# Patient Record
Sex: FEMALE | Race: WHITE | NOT HISPANIC OR LATINO | Employment: OTHER | URBAN - METROPOLITAN AREA
[De-identification: names, ages, dates, MRNs, and addresses within clinical notes are randomized per-mention and may not be internally consistent; named-entity substitution may affect disease eponyms.]

---

## 2021-04-12 ENCOUNTER — OFFICE VISIT CONVERTED (OUTPATIENT)
Dept: SURGERY | Facility: CLINIC | Age: 81
End: 2021-04-12
Attending: NURSE PRACTITIONER

## 2021-05-11 NOTE — H&P
History and Physical      Patient Name: Carol Antunez   Patient ID: [id number]   Sex: Female   YOB: 1940    Primary Care Provider: Rossy MARX   Referring Provider: Elizabeth MARX    Visit Date: April 12, 2021    Provider: ARASELI Telles   Location: [facility]   Location Address: [address] [place] [address]  [id number]   Location Phone: [phone]          Chief Complaint  · Requesting colonoscopy  · Age 50 or over  · Rectal Bleeding  · Constipation  · Abdominal Pain      History Of Present Illness  The patient is a 80 year old /White female presenting to the Surgical Specialist office on a referral from Elizabeth MARX.   Carol Antunez needs to have a diagnostic colonoscopy.   Patient states that they have had a colonoscopy. 10 years ago   Patient currently complains of: constipation, abdominal pain, and BRBPR   Patient Does not have family history of colon cancer.      Presents today on referral from Elizabeth Lu for abdominal pain, constipation and bright red blood per rectum.  Patient reports that all her symptoms have resolved about 3 weeks ago.  She reports that she was taking methotrexate and was introduced instructed to stop it and has not seen any rectal bleeding since then.  Patient believes that she was constipated and that is why she was seeing some bright red blood per rectum.  Denies any family history of colorectal cancer.    6/11: EGD & Colonoscopy ([facility]): Hiatal hernia; mild esophagitis; erosive gastritis; diverticulosis and internal hemorrhoids.       Past Medical History  Disease Name Date Onset Notes   Arthritis --  --    Arthritis --  --    Gout --  --    Heartburn --  --    Hypothyroidism, Acquired --  --    Joint Pain --  --    Rectal bleeding --  --          Past Surgical History  Procedure Name Date Notes   Appendectomy --  --    Hysterectomy --  --    Hysterectomy-Abdominal --  --    Thyroidectomy, Total --  --           Medication List  Name Date Started Instructions   diclofenac sodium 75 mg oral tablet,delayed release (DR/EC)  take 1 tablet (75 mg) by oral route 2 times per day   famotidine 40 mg oral tablet  take 1 tablet (40 mg) by oral route once daily at bedtime   folic acid 1 mg oral tablet  take 1 tablet (1 mg) by oral route once daily   Iron (ferrous sulfate) 325 mg (65 mg iron) oral tablet 02/08/2017 take 1 tablet by oral route 2 for 90 days   lisinopril-hydrochlorothiazide 20-25 mg oral tablet 02/08/2017 take 1 tablet by oral route once daily for 90 days   Synthroid 88 mcg oral tablet 02/08/2017 take 1 tablet (88 mcg) by oral route once daily for 90 days   Tylenol 325 mg oral tablet  take 1 - 2 tablets (325 - 650 mg) by oral route every 4-6 hours as needed   Tylenol PM Extra Strength  mg oral tablet  take 2 tablets by oral route once a day (at bedtime) as needed         Allergy List  Allergen Name Date Reaction Notes   naproxen --  --  --        Allergies Reconciled  Family Medical History  Disease Name Relative/Age Notes   No family history of colorectal cancer  --          Social History  Finding Status Start/Stop Quantity Notes   Alcohol Never --/-- --  04/12/2021 -    Assessed for Abuse/Neglect --  --/-- --  Denies abuse   Health care --  --/-- --  Care giver elder  states she stays with her Friday night thur Monday   Other Substance Use Never --/-- --  --    Tobacco Never --/-- --  --          Review of Systems  · Constitutional  o Denies  o : fever, chills  · Eyes  o Denies  o : yellowish discoloration of eyes  · HENT  o Denies  o : difficulty swallowing  · Cardiovascular  o Denies  o : chest pain, chest pain on exertion  · Respiratory  o Denies  o : shortness of breath  · Gastrointestinal  o Admits  o : constipation, abdominal pain, hematochezia  o Denies  o : nausea, vomiting, diarrhea  · Genitourinary  o Denies  o : abnormal color of urine  · Integument  o Denies  o :  "rash  · Neurologic  o Denies  o : tingling or numbness  · Musculoskeletal  o Denies  o : joint pain  · Endocrine  o Denies  o : weight gain, weight loss      Vitals  Date Time BP Position Site L\R Cuff Size HR RR TEMP (F) WT  HT  BMI kg/m2 BSA m2 O2 Sat FR L/min FiO2 HC       04/12/2021 11:43 AM       13  143lbs 0oz 5'  5\" 23.8 1.72             Physical Examination  · Constitutional  o Appearance  o : well developed, well-nourished, patient in no apparent distress  · Head and Face  o Head  o :   § Inspection  § : atraumatic, normocephalic  o Face  o :   § Inspection  § : no facial lesions  · Eyes  o Conjunctivae  o : conjunctivae normal  o Sclerae  o : sclerae white  · Neck  o Inspection/Palpation  o : normal appearance, no masses or tenderness, trachea midline  · Respiratory  o Respiratory Effort  o : breathing unlabored  · Skin and Subcutaneous Tissue  o General Inspection  o : no lesions present, no areas of discoloration, skin turgor normal, texture normal  · Neurologic  o Mental Status Examination  o :   § Orientation  § : grossly oriented to person, place and time  § Attention  § : attention normal, concentration abilities normal  § Fund of Knowledge  § : fund of knowledge within normal limits, patient aware of current events  o Gait and Station  o : normal gait, able to stand without difficulty  · Psychiatric  o Judgement and Insight  o : judgment and insight intact  o Mood and Affect  o : mood normal, affect appropriate              Assessment  · Bright red blood per rectum     569.3/K62.5  · Constipation     564.00/K59.00  · Internal hemorrhoids     455.0/K64.8    Problems Reconciled  Plan  · Medications  o Medications have been Reconciled  o Transition of Care or Provider Policy  · Instructions  o Given patient is asymptomatic at this time and her age we will monitor for further symptoms. I have instructed her that if she sees bright red blood per rectum again with abdominal pain or constipation to notify " the office and we will do some further testing.  o Electronically Identified Patient Education Materials Provided Electronically  · Disposition  o EMR dragon/transcription disclaimer: Much of this encounter note is an electronic transcription/translation of spoken language to printed text. Electronic translation of spoken language may permit erroneous, or at times nonsensical words or phrases to be inadvertently trasncribed; although I have reviewed the note for such errors, some may still exist.            Electronically Signed by: ARASELI Telles -Author on April 12, 2021 01:05:26 PM

## 2021-05-14 VITALS — HEIGHT: 65 IN | WEIGHT: 143 LBS | BODY MASS INDEX: 23.82 KG/M2 | RESPIRATION RATE: 13 BRPM

## 2022-04-21 ENCOUNTER — HOSPITAL ENCOUNTER (EMERGENCY)
Facility: HOSPITAL | Age: 82
Discharge: HOME | End: 2022-04-21
Attending: EMERGENCY MEDICINE | Admitting: EMERGENCY MEDICINE

## 2022-04-21 VITALS
SYSTOLIC BLOOD PRESSURE: 153 MMHG | DIASTOLIC BLOOD PRESSURE: 109 MMHG | TEMPERATURE: 98.1 F | OXYGEN SATURATION: 98 % | HEART RATE: 73 BPM | RESPIRATION RATE: 20 BRPM | HEIGHT: 65 IN

## 2022-04-21 DIAGNOSIS — K64.9 BLEEDING HEMORRHOIDS: Primary | ICD-10-CM

## 2022-04-21 LAB
ABO GROUP BLD: NORMAL
ABO GROUP BLD: NORMAL
ALBUMIN SERPL-MCNC: 3.9 G/DL (ref 3.5–5.2)
ALBUMIN/GLOB SERPL: 0.9 G/DL
ALP SERPL-CCNC: 102 U/L (ref 39–117)
ALT SERPL W P-5'-P-CCNC: 7 U/L (ref 1–33)
ANION GAP SERPL CALCULATED.3IONS-SCNC: 12.8 MMOL/L (ref 5–15)
APTT PPP: 28.2 SECONDS (ref 24.2–34.2)
AST SERPL-CCNC: 11 U/L (ref 1–32)
BASOPHILS # BLD AUTO: 0.05 10*3/MM3 (ref 0–0.2)
BASOPHILS NFR BLD AUTO: 0.5 % (ref 0–1.5)
BILIRUB SERPL-MCNC: 0.3 MG/DL (ref 0–1.2)
BLD GP AB SCN SERPL QL: NEGATIVE
BUN SERPL-MCNC: 26 MG/DL (ref 8–23)
BUN/CREAT SERPL: 23.9 (ref 7–25)
CALCIUM SPEC-SCNC: 9.1 MG/DL (ref 8.6–10.5)
CHLORIDE SERPL-SCNC: 99 MMOL/L (ref 98–107)
CO2 SERPL-SCNC: 24.2 MMOL/L (ref 22–29)
CREAT SERPL-MCNC: 1.09 MG/DL (ref 0.57–1)
D-LACTATE SERPL-SCNC: 1.4 MMOL/L (ref 0.5–2)
DEPRECATED RDW RBC AUTO: 54 FL (ref 37–54)
EGFRCR SERPLBLD CKD-EPI 2021: 51.1 ML/MIN/1.73
EOSINOPHIL # BLD AUTO: 0.04 10*3/MM3 (ref 0–0.4)
EOSINOPHIL NFR BLD AUTO: 0.4 % (ref 0.3–6.2)
ERYTHROCYTE [DISTWIDTH] IN BLOOD BY AUTOMATED COUNT: 16.3 % (ref 12.3–15.4)
GLOBULIN UR ELPH-MCNC: 4.2 GM/DL
GLUCOSE SERPL-MCNC: 145 MG/DL (ref 65–99)
HCT VFR BLD AUTO: 31.7 % (ref 34–46.6)
HEMOCCULT STL QL IA: POSITIVE
HGB BLD-MCNC: 9.8 G/DL (ref 12–15.9)
HOLD SPECIMEN: NORMAL
HOLD SPECIMEN: NORMAL
IMM GRANULOCYTES # BLD AUTO: 0.18 10*3/MM3 (ref 0–0.05)
IMM GRANULOCYTES NFR BLD AUTO: 1.9 % (ref 0–0.5)
INR PPP: 1.15 (ref 0.86–1.15)
LYMPHOCYTES # BLD AUTO: 1.78 10*3/MM3 (ref 0.7–3.1)
LYMPHOCYTES NFR BLD AUTO: 19.2 % (ref 19.6–45.3)
MCH RBC QN AUTO: 28.2 PG (ref 26.6–33)
MCHC RBC AUTO-ENTMCNC: 30.9 G/DL (ref 31.5–35.7)
MCV RBC AUTO: 91.1 FL (ref 79–97)
MONOCYTES # BLD AUTO: 0.65 10*3/MM3 (ref 0.1–0.9)
MONOCYTES NFR BLD AUTO: 7 % (ref 5–12)
NEUTROPHILS NFR BLD AUTO: 6.58 10*3/MM3 (ref 1.7–7)
NEUTROPHILS NFR BLD AUTO: 71 % (ref 42.7–76)
NRBC BLD AUTO-RTO: 0 /100 WBC (ref 0–0.2)
PLATELET # BLD AUTO: 471 10*3/MM3 (ref 140–450)
PMV BLD AUTO: 9.1 FL (ref 6–12)
POTASSIUM SERPL-SCNC: 4.9 MMOL/L (ref 3.5–5.2)
PROT SERPL-MCNC: 8.1 G/DL (ref 6–8.5)
PROTHROMBIN TIME: 14.9 SECONDS (ref 11.8–14.9)
RBC # BLD AUTO: 3.48 10*6/MM3 (ref 3.77–5.28)
RH BLD: POSITIVE
RH BLD: POSITIVE
SODIUM SERPL-SCNC: 136 MMOL/L (ref 136–145)
T&S EXPIRATION DATE: NORMAL
WBC NRBC COR # BLD: 9.28 10*3/MM3 (ref 3.4–10.8)
WHOLE BLOOD HOLD SPECIMEN: NORMAL
WHOLE BLOOD HOLD SPECIMEN: NORMAL

## 2022-04-21 PROCEDURE — 82274 ASSAY TEST FOR BLOOD FECAL: CPT | Performed by: EMERGENCY MEDICINE

## 2022-04-21 PROCEDURE — 86900 BLOOD TYPING SEROLOGIC ABO: CPT

## 2022-04-21 PROCEDURE — 86901 BLOOD TYPING SEROLOGIC RH(D): CPT

## 2022-04-21 PROCEDURE — 83605 ASSAY OF LACTIC ACID: CPT

## 2022-04-21 PROCEDURE — 85025 COMPLETE CBC W/AUTO DIFF WBC: CPT | Performed by: EMERGENCY MEDICINE

## 2022-04-21 PROCEDURE — 80053 COMPREHEN METABOLIC PANEL: CPT

## 2022-04-21 PROCEDURE — 99283 EMERGENCY DEPT VISIT LOW MDM: CPT

## 2022-04-21 PROCEDURE — 85610 PROTHROMBIN TIME: CPT | Performed by: NURSE PRACTITIONER

## 2022-04-21 PROCEDURE — 36415 COLL VENOUS BLD VENIPUNCTURE: CPT | Performed by: EMERGENCY MEDICINE

## 2022-04-21 PROCEDURE — 86850 RBC ANTIBODY SCREEN: CPT

## 2022-04-21 PROCEDURE — 85730 THROMBOPLASTIN TIME PARTIAL: CPT | Performed by: NURSE PRACTITIONER

## 2022-04-21 RX ORDER — LEVOTHYROXINE SODIUM 88 UG/1
88 TABLET ORAL DAILY
COMMUNITY

## 2022-04-21 RX ORDER — SODIUM CHLORIDE 0.9 % (FLUSH) 0.9 %
10 SYRINGE (ML) INJECTION AS NEEDED
Status: DISCONTINUED | OUTPATIENT
Start: 2022-04-21 | End: 2022-04-22 | Stop reason: HOSPADM

## 2022-04-21 RX ORDER — HYDROCORTISONE ACETATE 25 MG/1
25 SUPPOSITORY RECTAL 2 TIMES DAILY
Qty: 24 SUPPOSITORY | Refills: 0 | Status: SHIPPED | OUTPATIENT
Start: 2022-04-21

## 2022-04-21 RX ORDER — FERROUS SULFATE 325(65) MG
325 TABLET ORAL
COMMUNITY

## 2022-04-21 RX ORDER — DICLOFENAC SODIUM 75 MG/1
75 TABLET, DELAYED RELEASE ORAL 2 TIMES DAILY
COMMUNITY

## 2022-04-21 RX ORDER — PREDNISONE 1 MG/1
5 TABLET ORAL DAILY
COMMUNITY

## 2022-04-21 RX ORDER — LISINOPRIL AND HYDROCHLOROTHIAZIDE 25; 20 MG/1; MG/1
1 TABLET ORAL DAILY
COMMUNITY

## 2022-04-21 RX ORDER — ACETAMINOPHEN 325 MG/1
650 TABLET ORAL ONCE
Status: COMPLETED | OUTPATIENT
Start: 2022-04-21 | End: 2022-04-21

## 2022-04-21 RX ADMIN — SODIUM CHLORIDE 500 ML: 9 INJECTION, SOLUTION INTRAVENOUS at 22:22

## 2022-04-21 RX ADMIN — ACETAMINOPHEN 650 MG: 325 TABLET ORAL at 23:18

## 2022-04-22 NOTE — ED PROVIDER NOTES
Subjective   Patient presents to the emergency department today after having an episode of bright red rectal bleeding.  She states that she was trying to have a bowel movement and was constipated.  She denies pain.  She does have a history of hemorrhoids.  She has a knee surgery scheduled on 26 April and was advised to stop taking her vitamins, iron pills, and arthritis medicine (diclofenac).  She is not on any blood thinners.  She does report that her primary care told her that her potassium was high and they gave her two brown bottles to drink which would cause her to have diarrhea but she has not taken them yet.      History provided by:  Patient   used: No        Review of Systems   Constitutional: Negative for chills and fever.   HENT: Negative for congestion, ear pain, rhinorrhea and sore throat.    Eyes: Negative for pain.   Respiratory: Negative for cough and shortness of breath.    Cardiovascular: Negative for chest pain.   Gastrointestinal: Positive for anal bleeding and constipation. Negative for abdominal pain, diarrhea, nausea and vomiting.   Genitourinary: Negative for decreased urine volume, dysuria and flank pain.   Musculoskeletal: Negative for arthralgias and myalgias.   Skin: Negative for rash.   Neurological: Negative for seizures and headaches.   All other systems reviewed and are negative.      Past Medical History:   Diagnosis Date   • Anemia    • Arthritis    • Disease of thyroid gland    • Hemorrhoid    • Hypertension        No Known Allergies    Past Surgical History:   Procedure Laterality Date   • APPENDECTOMY     • HYSTERECTOMY     • THYROID SURGERY         History reviewed. No pertinent family history.    Social History     Socioeconomic History   • Marital status:    Tobacco Use   • Smoking status: Never Smoker   Substance and Sexual Activity   • Alcohol use: Not Currently   • Drug use: Never   • Sexual activity: Defer           Objective   Physical  Exam  Vitals and nursing note reviewed.   Constitutional:       General: She is not in acute distress.     Appearance: Normal appearance. She is normal weight. She is not ill-appearing, toxic-appearing or diaphoretic.   HENT:      Head: Normocephalic and atraumatic.      Right Ear: External ear normal.      Left Ear: External ear normal.   Eyes:      General: No scleral icterus.     Conjunctiva/sclera: Conjunctivae normal.      Pupils: Pupils are equal, round, and reactive to light.   Cardiovascular:      Rate and Rhythm: Normal rate and regular rhythm.      Heart sounds: Normal heart sounds.   Pulmonary:      Effort: Pulmonary effort is normal. No respiratory distress.      Breath sounds: Normal breath sounds.   Abdominal:      General: Bowel sounds are normal. There is no distension.      Palpations: Abdomen is soft.      Tenderness: There is no abdominal tenderness.   Genitourinary:     Rectum: Guaiac result positive. External hemorrhoid and internal hemorrhoid present. No mass, tenderness or anal fissure. Normal anal tone.   Musculoskeletal:         General: Tenderness (Right knee pain, chronic) present. No swelling, deformity or signs of injury. Normal range of motion.      Cervical back: Normal range of motion and neck supple.   Skin:     General: Skin is warm and dry.      Capillary Refill: Capillary refill takes less than 2 seconds.   Neurological:      General: No focal deficit present.      Mental Status: She is alert and oriented to person, place, and time.   Psychiatric:         Mood and Affect: Mood normal.         Behavior: Behavior normal.         Procedures           ED Course                                                 MDM  Number of Diagnoses or Management Options  Bleeding hemorrhoids: new and requires workup     Amount and/or Complexity of Data Reviewed  Clinical lab tests: reviewed and ordered  Discuss the patient with other providers: yes (Dr. Puri)    Risk of Complications, Morbidity,  and/or Mortality  Presenting problems: moderate  Diagnostic procedures: moderate  Management options: moderate  General comments: Patient was advised to avoid constipation.  She will use the prescribed medication which she has been advised may cause diarrhea.  If this does not relieve her constipation, she will use over-the-counter stool softeners.    Patient Progress  Patient progress: stable      Final diagnoses:   Bleeding hemorrhoids       ED Disposition  ED Disposition     ED Disposition   Discharge    Condition   Stable    Comment   --             Luis Enrique Archer MD  [address] [place] [id number] [phone]    Schedule an appointment as soon as possible for a visit       Salvatore Coates MD  [facility] [place] [id number] [phone]    Schedule an appointment as soon as possible for a visit            Medication List      New Prescriptions    hydrocortisone 25 MG suppository  Commonly known as: ANUSOL-HC  Insert 1 suppository into the rectum 2 (Two) Times a Day.           Where to Get Your Medications      These medications were sent to [id number] - [place], [place] - [address] [facility] [phone] [facility] [phone]   [address] [address]    Phone: [phone]   · hydrocortisone 25 MG suppository          Xena Hackett APRN  04/22/22 [id number]

## 2024-03-05 ENCOUNTER — OFFICE VISIT (OUTPATIENT)
Dept: PODIATRY | Facility: CLINIC | Age: 84
End: 2024-03-05
Payer: MEDICARE

## 2024-03-05 VITALS
WEIGHT: 138 LBS | DIASTOLIC BLOOD PRESSURE: 76 MMHG | TEMPERATURE: 98 F | HEIGHT: 65 IN | BODY MASS INDEX: 22.99 KG/M2 | SYSTOLIC BLOOD PRESSURE: 114 MMHG | HEART RATE: 77 BPM | OXYGEN SATURATION: 94 %

## 2024-03-05 DIAGNOSIS — E11.42 TYPE 2 DIABETES MELLITUS WITH DIABETIC POLYNEUROPATHY, WITH LONG-TERM CURRENT USE OF INSULIN: ICD-10-CM

## 2024-03-05 DIAGNOSIS — Z79.4 TYPE 2 DIABETES MELLITUS WITH DIABETIC POLYNEUROPATHY, WITH LONG-TERM CURRENT USE OF INSULIN: ICD-10-CM

## 2024-03-05 DIAGNOSIS — E11.8 DM FEET: ICD-10-CM

## 2024-03-05 DIAGNOSIS — R26.2 DIFFICULTY WALKING: Primary | ICD-10-CM

## 2024-03-05 DIAGNOSIS — L60.0 ONYCHOCRYPTOSIS: ICD-10-CM

## 2024-03-05 DIAGNOSIS — B35.1 ONYCHOMYCOSIS: ICD-10-CM

## 2024-03-05 PROCEDURE — G8404 LOW EXTEMITY NEUR EXAM DOCUM: HCPCS | Performed by: PODIATRIST

## 2024-03-05 PROCEDURE — 99203 OFFICE O/P NEW LOW 30 MIN: CPT | Performed by: PODIATRIST

## 2024-03-05 PROCEDURE — 1160F RVW MEDS BY RX/DR IN RCRD: CPT | Performed by: PODIATRIST

## 2024-03-05 PROCEDURE — 1159F MED LIST DOCD IN RCRD: CPT | Performed by: PODIATRIST

## 2024-03-05 PROCEDURE — 11721 DEBRIDE NAIL 6 OR MORE: CPT | Performed by: PODIATRIST

## 2024-03-05 RX ORDER — ACETAMINOPHEN/DIPHENHYDRAMINE 500MG-25MG
TABLET ORAL
COMMUNITY

## 2024-03-05 RX ORDER — ERGOCALCIFEROL 1.25 MG/1
1 CAPSULE ORAL WEEKLY
COMMUNITY
Start: 2024-02-12

## 2024-03-05 RX ORDER — INSULIN GLARGINE 100 [IU]/ML
INJECTION, SOLUTION SUBCUTANEOUS
COMMUNITY
Start: 2023-12-20

## 2024-03-05 RX ORDER — LANCETS
1 EACH MISCELLANEOUS 2 TIMES DAILY
COMMUNITY
Start: 2024-01-19

## 2024-03-05 RX ORDER — ACETAMINOPHEN 325 MG/1
325 TABLET ORAL AS NEEDED
COMMUNITY

## 2024-03-05 RX ORDER — BLOOD SUGAR DIAGNOSTIC
STRIP MISCELLANEOUS
COMMUNITY
Start: 2023-11-27

## 2024-03-05 RX ORDER — ABATACEPT 125 MG/ML
INJECTION, SOLUTION SUBCUTANEOUS WEEKLY
COMMUNITY
Start: 2024-02-23

## 2024-03-05 RX ORDER — PEN NEEDLE, DIABETIC 32GX 5/32"
NEEDLE, DISPOSABLE MISCELLANEOUS DAILY
COMMUNITY
Start: 2024-01-23

## 2024-03-05 RX ORDER — HYDROCHLOROTHIAZIDE 25 MG/1
1 TABLET ORAL DAILY
COMMUNITY
Start: 2024-02-26

## 2024-03-05 RX ORDER — TRAMADOL HYDROCHLORIDE 50 MG/1
TABLET ORAL
COMMUNITY
Start: 2024-01-25

## 2024-03-05 NOTE — PROGRESS NOTES
[facility] - PODIATRY    Today's Date: 03/05/24    Patient Name: Carol Antunez  MRN: [id number]  CSN: [id number]  PCP: Luis Enrique Archer MD, Last PCP Visit:  11/3/2023  Referring Provider: Referring, Self    SUBJECTIVE     Chief Complaint   Patient presents with    Left Foot - Establish Care, Annual Exam, Diabetes, Nail Problem    Right Foot - Establish Care, Annual Exam, Diabetes, Nail Problem     HPI: Carol Antunez, a 83 y.o.female, presents to clinic for painful toenail and a diabetic foot evaluation.  Pt presents with daughter.    New, Established, New Problem:  New  Location:  Toenails  Duration:   Greater than five years  Onset:  Gradual  Nature:  sore with palpation.  Stable, worsening, improving:   worsening  Aggravating factors:  Pain with shoe gear and ambulation.  Previous Treatment: Unable to trim their own toenails.    Patient controlling diabetes via:  insulin    Patient states their last blood glucose was:  1010    Patient denies any fevers, chills, nausea, vomiting, shortness of breath, nor any other constitutional signs nor symptoms.    Past Medical History:   Diagnosis Date    Anemia     Arthritis     Diabetes mellitus     Disease of thyroid gland     Hemorrhoid     Hypertension      Past Surgical History:   Procedure Laterality Date    APPENDECTOMY      HYSTERECTOMY      THYROID SURGERY       Family History   Family history unknown: Yes     Social History     Socioeconomic History    Marital status:    Tobacco Use    Smoking status: Never    Smokeless tobacco: Never   Vaping Use    Vaping status: Never Used   Substance and Sexual Activity    Alcohol use: Not Currently    Drug use: Never    Sexual activity: Defer     No Known Allergies  Current Outpatient Medications   Medication Sig Dispense Refill    Accu-Chek Guide test strip CHECK BLOOD SUGAR FOUR TIMES DAILY AND AS NEEDED      Accu-Chek Softclix Lancets lancets 1 each by Other route 2 (Two) Times a Day.       "acetaminophen (Tylenol) 325 MG tablet 1 tablet As Needed.      BD Pen Needle Deana 2nd Gen 32G X 4 MM misc Daily. as directed      Continuous Blood Gluc Sensor (FreeStyle Charley 2 Sensor) misc USE TO MONITOR BLOOD GLUCOSE. CHANGE EVERY 14 DAYS      diphenhydrAMINE-acetaminophen (Tylenol PM Extra Strength)  MG tablet per tablet Tylenol PM Extra Strength  mg oral tablet take 2 tablets by oral route once a day (at bedtime) as needed   Active      ferrous sulfate 325 (65 FE) MG tablet Take 1 tablet by mouth Daily With Breakfast.      hydroCHLOROthiazide 25 MG tablet Take 1 tablet by mouth Daily.      Lantus SoloStar 100 UNIT/ML injection pen INJECT 10 UNITS UNDER THE SKIN DAILY      levothyroxine (SYNTHROID, LEVOTHROID) 88 MCG tablet Take 1 tablet by mouth Daily.      metFORMIN (GLUCOPHAGE) 500 MG tablet Take 1 tablet by mouth Every 12 (Twelve) Hours.      Orencia ClickJect 125 MG/ML solution auto-injector 1 (One) Time Per Week.      predniSONE (DELTASONE) 5 MG tablet Take 1 tablet by mouth Daily.      traMADol (ULTRAM) 50 MG tablet TAKE 1 TABLET BY MOUTH THREE TIMES DAILY AS NEEDED FOR SEVERE PAIN      vitamin D (ERGOCALCIFEROL) 1.25 MG (50737 UT) capsule capsule Take 1 capsule by mouth 1 (One) Time Per Week.      diclofenac (VOLTAREN) 75 MG EC tablet Take 75 mg by mouth 2 (Two) Times a Day. (Patient not taking: Reported on 3/5/2024)      hydrocortisone (ANUSOL-HC) 25 MG suppository Insert 1 suppository into the rectum 2 (Two) Times a Day. (Patient not taking: Reported on 3/5/2024) 24 suppository 0     No current facility-administered medications for this visit.     Review of Systems   Constitutional: Negative.    Skin:         Painful toenails.   All other systems reviewed and are negative.      OBJECTIVE     Vitals:    03/05/24 1400   BP: 114/76   Pulse: 77   Temp: 98 °F (36.7 °C)   SpO2: 94%       Body mass index is 22.96 kg/m².    No results found for: \"HGBA1C\"    Lab Results   Component Value Date    " GLUCOSE 145 (H) 04/21/2022    CALCIUM 9.1 04/21/2022     04/21/2022    K 4.9 04/21/2022    CO2 24.2 04/21/2022    CL 99 04/21/2022    BUN 26 (H) 04/21/2022    CREATININE 1.09 (H) 04/21/2022    BCR 23.9 04/21/2022    ANIONGAP 12.8 04/21/2022       Patient seen in no apparent distress.      PHYSICAL EXAM:     Foot/Ankle Exam    GENERAL  Diabetic foot exam performed    Appearance:  elderly and chronically ill  Orientation:  AAOx3  Affect:  appropriate  Gait:  antalgic  Assistance:  independent and walker  Right shoe gear: casual shoe  Left shoe gear: casual shoe    VASCULAR     Right Foot Vascularity   Dorsalis pedis:  1+  Posterior tibial:  1+  Skin temperature:  warm  Edema grading:  None  CFT:  < 3 seconds  Pedal hair growth:  Absent  Varicosities:  moderate varicosities     Left Foot Vascularity   Dorsalis pedis:  1+  Posterior tibial:  1+  Skin temperature:  warm  Edema grading:  None  CFT:  < 3 seconds  Pedal hair growth:  Absent  Varicosities:  moderate varicosities     NEUROLOGIC     Right Foot Neurologic   Normal sensation    Light touch sensation: normal  Vibratory sensation: normal  Hot/Cold sensation: normal  Protective Sensation using [place] Monofilament:   Sites intact: 10  Sites tested: 10     Left Foot Neurologic   Normal sensation    Light touch sensation: normal  Vibratory sensation: normal  Hot/Cold sensation:  normal  Protective Sensation using [place] Monofilament:   Sites intact: 10  Sites tested: 10    MUSCULOSKELETAL     Right Foot Musculoskeletal   Arch:  Pes planus  Hallux valgus: Yes       Left Foot Musculoskeletal   Arch:  Pes planus  Hallux valgus: Yes      MUSCLE STRENGTH     Right Foot Muscle Strength   Foot dorsiflexion:  4-  Foot plantar flexion:  4-  Foot inversion:  4-  Foot eversion:  4-     Left Foot Muscle Strength   Foot dorsiflexion:  4-  Foot plantar flexion:  4-  Foot inversion:  4-  Foot eversion:  4-    RANGE OF MOTION     Right Foot Range of Motion    Foot and ankle ROM within normal limits       Left Foot Range of Motion   Foot and ankle ROM within normal limits      DERMATOLOGIC      Right Foot Dermatologic   Skin  Right foot skin is intact.   Nails  1.  Positive for elongated, onychomycosis, abnormal thickness, subungual debris and ingrown toenail.  2.  Positive for elongated, onychomycosis, abnormal thickness, subungual debris and ingrown toenail.  3.  Positive for elongated, onychomycosis, abnormal thickness, subungual debris and ingrown toenail.  4.  Positive for elongated, onychomycosis, abnormal thickness, subungual debris and ingrown toenail.  5.  Positive for elongated, onychomycosis, abnormal thickness, subungual debris and ingrown toenail.  Nails comment:  Toenails 1, 2, 3, 4, and 5     Left Foot Dermatologic   Skin  Left foot skin is intact.   Nails comment:  Toenails 1, 2, 3, 4, and 5  Nails  1.  Positive for elongated, onychomycosis, abnormal thickness, subungual debris and ingrown toenail.  2.  Positive for elongated, onychomycosis, abnormal thickness, subungual debris and ingrown toenail.  3.  Positive for elongated, onychomycosis, abnormal thickness, subungual debris and ingrown toenail.  4.  Positive for elongated, onychomycosis, abnormally thick, subungual debris and ingrown toenail.  5.  Positive for elongated, onychomycosis, abnormally thick, subungual debris and ingrown toenail.    Diabetic Foot Exam Performed and Monofilament Test Performed    ASSESSMENT/PLAN     Diagnoses and all orders for this visit:    1. Difficulty walking (Primary)    2. Onychocryptosis    3. Onychomycosis    4. Type 2 diabetes mellitus with diabetic polyneuropathy, with long-term current use of insulin    5. DM feet        Comprehensive lower extremity examination and evaluation was performed.    Discussed findings and treatment plan including risks, benefits, and treatment options with patient in detail. Patient agreed with treatment plan.    Medications and  allergies reviewed.  Reviewed available blood glucose and HgB A1C lab values along with other pertinent labs.  These were discussed with the patient as to their importance of diabetic maintenance.    Toenails 1, 2, 3, 4, 5 on Right and 1, 2, 3, 4, 5 on Left were debrided with nail nippers then filed with a Dremel nail taisha.  Patient tolerated procedure well without complications.    Diabetic foot exam performed and documented this date, compliant with CQM required standards. Detail of findings as noted in physical exam.  Lower extremity Neurologic exam for diabetic patient performed and documented this date, compliant with PQRS required standards. Detail of findings as noted in physical exam.  Advised patient importance of good routine lower extremity hygiene. Advised patient importance of evaluating for intact skin and pain free nail borders.  Advised patient to use mirror to evaluate plantar/ soles of feet for better visualization. Advised patient monitor and phone office to be seen if any cracking to skin, open lesions, painful nail borders or if nails become elongated prior to next visit. Advised patient importance of daily cleansing of lower extremities, followed by good skin cream to maintain normal hydration of skin. Also advised patient importance of close daily monitoring of blood sugar. Advised to regulate diet and medications to maintain control of blood sugar in optimal range. Contact primary care provider if difficulties maintaining blood sugar levels.  Advised Patient of presence of Diabetes Mellitus condition.  Advised Patient risk of progression and worsening or improvement, then return of condition.  Will monitor condition for any change in future. Treat with most appropriate treatment pending status of condition.  Counseled and advised patient extensively on nature and ramifications of diabetes. Standard instructions given to patient for good diabetic foot care and maintenance. Advised importance  of careful monitoring to avoid break down and complications secondary to diabetes. Advised patient importance of strict maintenance of blood sugar control. Advised patient of possible ominous results from neglect of condition, i.e.: amputation/ loss of digits, feet and legs, or even death.  Patient states understands counseling, will monitor closely, continue good hygiene and routine diabetic foot care. Patient will contact office should they have any questions or problems.      An After Visit Summary was printed and given to the patient at discharge, including (if requested) any available informative/educational handouts regarding diagnosis, treatment, or medications. All questions were answered to patient/family satisfaction. Should symptoms fail to improve or worsen they agree to call or return to clinic or to go to the Emergency Department. Discussed the importance of following up with any needed screening tests/labs/specialist appointments and any requested follow-up recommended by me today. Importance of maintaining follow-up discussed and patient accepts that missed appointments can delay diagnosis and potentially lead to worsening of conditions.    Return in about 9 weeks (around 5/7/2024) for Toenail Care., or sooner if acute issues arise.    This document has been electronically signed by Lucas Copeland DPM on March 5, 2024 14:37 EST

## 2024-05-21 ENCOUNTER — OFFICE VISIT (OUTPATIENT)
Dept: PODIATRY | Facility: CLINIC | Age: 84
End: 2024-05-21
Payer: MEDICARE

## 2024-05-21 VITALS
HEIGHT: 65 IN | DIASTOLIC BLOOD PRESSURE: 95 MMHG | HEART RATE: 103 BPM | SYSTOLIC BLOOD PRESSURE: 132 MMHG | OXYGEN SATURATION: 94 % | BODY MASS INDEX: 22.99 KG/M2 | TEMPERATURE: 97.5 F | WEIGHT: 138 LBS

## 2024-05-21 DIAGNOSIS — B35.1 ONYCHOMYCOSIS: ICD-10-CM

## 2024-05-21 DIAGNOSIS — E11.42 TYPE 2 DIABETES MELLITUS WITH DIABETIC POLYNEUROPATHY, WITH LONG-TERM CURRENT USE OF INSULIN: ICD-10-CM

## 2024-05-21 DIAGNOSIS — R26.2 DIFFICULTY WALKING: Primary | ICD-10-CM

## 2024-05-21 DIAGNOSIS — L60.0 ONYCHOCRYPTOSIS: ICD-10-CM

## 2024-05-21 DIAGNOSIS — Z79.4 TYPE 2 DIABETES MELLITUS WITH DIABETIC POLYNEUROPATHY, WITH LONG-TERM CURRENT USE OF INSULIN: ICD-10-CM

## 2024-05-21 DIAGNOSIS — E11.8 DM FEET: ICD-10-CM

## 2024-05-21 NOTE — PROGRESS NOTES
[facility] - PODIATRY    Today's Date: 05/21/24    Patient Name: Carol Antunez  MRN: [id number]  CSN: [id number]  PCP: Luis Enrique Archer MD, Last PCP Visit:  5/7/2024  Referring Provider: No ref. provider found    SUBJECTIVE     Chief Complaint   Patient presents with    Left Foot - Follow-up, Nail Problem    Right Foot - Follow-up, Nail Problem     HPI: Carol Antunez, a 83 y.o.female, presents to clinic for painful toenail.  Pt presents with daughter.    New, Established, New Problem:  New  Location:  Toenails  Duration:   Greater than five years  Onset:  Gradual  Nature:  sore with palpation.  Stable, worsening, improving:   worsening  Aggravating factors:  Pain with shoe gear and ambulation.  Previous Treatment: Unable to trim their own toenails.    Patient controlling diabetes via:  insulin    Patient states their last blood glucose was:  244    No recent medical changes.    Patient denies any fevers, chills, nausea, vomiting, shortness of breath, nor any other constitutional signs nor symptoms.    I have reviewed/confirmed previously documented HPI with no changes.     Past Medical History:   Diagnosis Date    Anemia     Arthritis     Diabetes mellitus     Disease of thyroid gland     Hemorrhoid     Hypertension      Past Surgical History:   Procedure Laterality Date    APPENDECTOMY      HYSTERECTOMY      THYROID SURGERY       Family History   Family history unknown: Yes     Social History     Socioeconomic History    Marital status:    Tobacco Use    Smoking status: Never    Smokeless tobacco: Never   Vaping Use    Vaping status: Never Used   Substance and Sexual Activity    Alcohol use: Not Currently    Drug use: Never    Sexual activity: Defer     No Known Allergies  Current Outpatient Medications   Medication Sig Dispense Refill    Accu-Chek Guide test strip CHECK BLOOD SUGAR FOUR TIMES DAILY AND AS NEEDED      Accu-Chek Softclix Lancets lancets 1 each by Other route 2 (Two) Times a  "Day.      acetaminophen (Tylenol) 325 MG tablet 1 tablet As Needed.      BD Pen Needle Deana 2nd Gen 32G X 4 MM misc Daily. as directed      Continuous Blood Gluc Sensor (FreeStyle Charley 2 Sensor) misc USE TO MONITOR BLOOD GLUCOSE. CHANGE EVERY 14 DAYS      diphenhydrAMINE-acetaminophen (Tylenol PM Extra Strength)  MG tablet per tablet Tylenol PM Extra Strength  mg oral tablet take 2 tablets by oral route once a day (at bedtime) as needed   Active      ferrous sulfate 325 (65 FE) MG tablet Take 1 tablet by mouth Daily With Breakfast.      hydroCHLOROthiazide 25 MG tablet Take 1 tablet by mouth Daily.      hydrocortisone (ANUSOL-HC) 25 MG suppository Insert 1 suppository into the rectum 2 (Two) Times a Day. 24 suppository 0    Lantus SoloStar 100 UNIT/ML injection pen INJECT 10 UNITS UNDER THE SKIN DAILY      levothyroxine (SYNTHROID, LEVOTHROID) 88 MCG tablet Take 1 tablet by mouth Daily.      metFORMIN (GLUCOPHAGE) 500 MG tablet Take 1 tablet by mouth Every 12 (Twelve) Hours.      Orencia ClickJect 125 MG/ML solution auto-injector 1 (One) Time Per Week.      predniSONE (DELTASONE) 5 MG tablet Take 1 tablet by mouth Daily.      traMADol (ULTRAM) 50 MG tablet TAKE 1 TABLET BY MOUTH THREE TIMES DAILY AS NEEDED FOR SEVERE PAIN      vitamin D (ERGOCALCIFEROL) 1.25 MG (73570 UT) capsule capsule Take 1 capsule by mouth 1 (One) Time Per Week.      diclofenac (VOLTAREN) 75 MG EC tablet Take 75 mg by mouth 2 (Two) Times a Day. (Patient not taking: Reported on 5/21/2024)       No current facility-administered medications for this visit.     Review of Systems   Constitutional: Negative.    Skin:         Painful toenails.   All other systems reviewed and are negative.      OBJECTIVE     Vitals:    05/21/24 1439   BP: 132/95   Pulse: 103   Temp: 97.5 °F (36.4 °C)   SpO2: 94%       Body mass index is 22.96 kg/m².    No results found for: \"HGBA1C\"    Lab Results   Component Value Date    GLUCOSE 145 (H) 04/21/2022    " CALCIUM 9.1 04/21/2022     04/21/2022    K 4.9 04/21/2022    CO2 24.2 04/21/2022    CL 99 04/21/2022    BUN 26 (H) 04/21/2022    CREATININE 1.09 (H) 04/21/2022    BCR 23.9 04/21/2022    ANIONGAP 12.8 04/21/2022       Patient seen in no apparent distress.      PHYSICAL EXAM:     Foot/Ankle Exam    GENERAL  Appearance:  elderly and chronically ill  Orientation:  AAOx3  Affect:  appropriate  Gait:  antalgic  Assistance:  independent and walker  Right shoe gear: casual shoe  Left shoe gear: casual shoe    VASCULAR     Right Foot Vascularity   Dorsalis pedis:  1+  Posterior tibial:  1+  Skin temperature:  warm  Edema grading:  None  CFT:  < 3 seconds  Pedal hair growth:  Absent  Varicosities:  moderate varicosities     Left Foot Vascularity   Dorsalis pedis:  1+  Posterior tibial:  1+  Skin temperature:  warm  Edema grading:  None  CFT:  < 3 seconds  Pedal hair growth:  Absent  Varicosities:  moderate varicosities     NEUROLOGIC     Right Foot Neurologic   Normal sensation    Light touch sensation: normal  Vibratory sensation: normal  Hot/Cold sensation: normal  Protective Sensation using [place]-Itz Monofilament:   Sites intact: 10  Sites tested: 10     Left Foot Neurologic   Normal sensation    Light touch sensation: normal  Vibratory sensation: normal  Hot/Cold sensation:  normal  Protective Sensation using Makanda-Itz Monofilament:   Sites intact: 10  Sites tested: 10    MUSCULOSKELETAL     Right Foot Musculoskeletal   Arch:  Pes planus  Hallux valgus: Yes       Left Foot Musculoskeletal   Arch:  Pes planus  Hallux valgus: Yes      MUSCLE STRENGTH     Right Foot Muscle Strength   Foot dorsiflexion:  4-  Foot plantar flexion:  4-  Foot inversion:  4-  Foot eversion:  4-     Left Foot Muscle Strength   Foot dorsiflexion:  4-  Foot plantar flexion:  4-  Foot inversion:  4-  Foot eversion:  4-    RANGE OF MOTION     Right Foot Range of Motion   Foot and ankle ROM within normal limits       Left Foot Range  of Motion   Foot and ankle ROM within normal limits      DERMATOLOGIC      Right Foot Dermatologic   Skin  Right foot skin is intact.   Nails  1.  Positive for elongated, onychomycosis, abnormal thickness, subungual debris and ingrown toenail.  2.  Positive for elongated, onychomycosis, abnormal thickness, subungual debris and ingrown toenail.  3.  Positive for elongated, onychomycosis, abnormal thickness, subungual debris and ingrown toenail.  4.  Positive for elongated, onychomycosis, abnormal thickness, subungual debris and ingrown toenail.  5.  Positive for elongated, onychomycosis, abnormal thickness, subungual debris and ingrown toenail.  Nails comment:  Toenails 1, 2, 3, 4, and 5     Left Foot Dermatologic   Skin  Left foot skin is intact.   Nails comment:  Toenails 1, 2, 3, 4, and 5  Nails  1.  Positive for elongated, onychomycosis, abnormal thickness, subungual debris and ingrown toenail.  2.  Positive for elongated, onychomycosis, abnormal thickness, subungual debris and ingrown toenail.  3.  Positive for elongated, onychomycosis, abnormal thickness, subungual debris and ingrown toenail.  4.  Positive for elongated, onychomycosis, abnormally thick, subungual debris and ingrown toenail.  5.  Positive for elongated, onychomycosis, abnormally thick, subungual debris and ingrown toenail.    I have reexamined the patient the results are consistent with the previously documented exam.    ASSESSMENT/PLAN     Diagnoses and all orders for this visit:    1. Difficulty walking (Primary)    2. Onychocryptosis    3. Onychomycosis    4. Type 2 diabetes mellitus with diabetic polyneuropathy, with long-term current use of insulin    5. DM feet        Comprehensive lower extremity examination and evaluation was performed.    Discussed findings and treatment plan including risks, benefits, and treatment options with patient in detail. Patient agreed with treatment plan.    Medications and allergies reviewed.  Reviewed  available blood glucose and HgB A1C lab values along with other pertinent labs.  These were discussed with the patient as to their importance of diabetic maintenance.    Toenails 1, 2, 3, 4, 5 on Right and 1, 2, 3, 4, 5 on Left were debrided with nail nippers then filed with a Dremel nail taisha.  Patient tolerated procedure well without complications.    An After Visit Summary was printed and given to the patient at discharge, including (if requested) any available informative/educational handouts regarding diagnosis, treatment, or medications. All questions were answered to patient/family satisfaction. Should symptoms fail to improve or worsen they agree to call or return to clinic or to go to the Emergency Department. Discussed the importance of following up with any needed screening tests/labs/specialist appointments and any requested follow-up recommended by me today. Importance of maintaining follow-up discussed and patient accepts that missed appointments can delay diagnosis and potentially lead to worsening of conditions.    Return in about 9 weeks (around 7/23/2024) for Toenail Care., or sooner if acute issues arise.    I have reviewed the assessment and plan and verified the accuracy of it. No changes to assessment and plan since the information was documented. Lucas Copeland DPM 05/21/24     I have dictated this note utilizing Dragon Dictation.  Please note that portions of this note were completed with a voice recognition program.  Part of this note may be an electronic transcription/translation of spoken language to printed text using the Dragon Dictation System.          This document has been electronically signed by Lucas Copeland DPM on May 21, 2024 14:55 EDT

## 2024-08-13 ENCOUNTER — OFFICE VISIT (OUTPATIENT)
Dept: PODIATRY | Facility: CLINIC | Age: 84
End: 2024-08-13
Payer: MEDICARE

## 2024-08-13 VITALS
OXYGEN SATURATION: 95 % | HEIGHT: 65 IN | SYSTOLIC BLOOD PRESSURE: 117 MMHG | DIASTOLIC BLOOD PRESSURE: 73 MMHG | WEIGHT: 138 LBS | BODY MASS INDEX: 22.99 KG/M2 | HEART RATE: 82 BPM | TEMPERATURE: 97.3 F

## 2024-08-13 DIAGNOSIS — E11.42 TYPE 2 DIABETES MELLITUS WITH DIABETIC POLYNEUROPATHY, WITH LONG-TERM CURRENT USE OF INSULIN: ICD-10-CM

## 2024-08-13 DIAGNOSIS — L60.0 ONYCHOCRYPTOSIS: ICD-10-CM

## 2024-08-13 DIAGNOSIS — B35.1 ONYCHOMYCOSIS: Primary | ICD-10-CM

## 2024-08-13 DIAGNOSIS — Z79.4 TYPE 2 DIABETES MELLITUS WITH DIABETIC POLYNEUROPATHY, WITH LONG-TERM CURRENT USE OF INSULIN: ICD-10-CM

## 2024-08-13 DIAGNOSIS — R26.2 DIFFICULTY WALKING: ICD-10-CM

## 2024-08-13 DIAGNOSIS — E11.8 DM FEET: ICD-10-CM

## 2024-08-13 PROCEDURE — 1160F RVW MEDS BY RX/DR IN RCRD: CPT | Performed by: PODIATRIST

## 2024-08-13 PROCEDURE — 11721 DEBRIDE NAIL 6 OR MORE: CPT | Performed by: PODIATRIST

## 2024-08-13 PROCEDURE — 1159F MED LIST DOCD IN RCRD: CPT | Performed by: PODIATRIST

## 2024-08-13 NOTE — PROGRESS NOTES
[facility] - PODIATRY    Today's Date: 08/13/24    Patient Name: Carol Antunez  MRN: [id number]  CSN: [id number]  PCP: Luis Enrique Archer MD, Last PCP Visit:  5/7/2024  Referring Provider: No ref. provider found    SUBJECTIVE     Chief Complaint   Patient presents with    Left Foot - Follow-up, Nail Problem    Right Foot - Follow-up, Nail Problem     HPI: Carol Antunez, a 84 y.o.female, presents to clinic for painful toenail.  Pt presents with daughter.    New, Established, New Problem: Established    Location:  Toenails    Duration:   Greater than one year    Onset:  Gradual    Nature:  sore with palpation.    Stable, worsening, improving:   Improving    Aggravating factors:  Pain with shoe gear and ambulation.    Previous Treatment:  Debridement    Patient controlling diabetes via:  insulin    Patient states their last blood glucose was:  89.    Medical changes:  none.    Patient denies any fevers, chills, nausea, vomiting, shortness of breath, nor any other constitutional signs nor symptoms.    I have reviewed/confirmed previously documented HPI with no changes.     Past Medical History:   Diagnosis Date    Anemia     Arthritis     Diabetes mellitus     Disease of thyroid gland     Hemorrhoid     Hypertension      Past Surgical History:   Procedure Laterality Date    APPENDECTOMY      HYSTERECTOMY      THYROID SURGERY       Family History   Family history unknown: Yes     Social History     Socioeconomic History    Marital status:    Tobacco Use    Smoking status: Never    Smokeless tobacco: Never   Vaping Use    Vaping status: Never Used   Substance and Sexual Activity    Alcohol use: Not Currently    Drug use: Never    Sexual activity: Defer     No Known Allergies  Current Outpatient Medications   Medication Sig Dispense Refill    Accu-Chek Guide test strip CHECK BLOOD SUGAR FOUR TIMES DAILY AND AS NEEDED      Accu-Chek Softclix Lancets lancets 1 each by Other route 2 (Two) Times a Day.   "    acetaminophen (Tylenol) 325 MG tablet 1 tablet As Needed.      BD Pen Needle Deana 2nd Gen 32G X 4 MM misc Daily. as directed      Continuous Blood Gluc Sensor (FreeStyle Charley 2 Sensor) misc USE TO MONITOR BLOOD GLUCOSE. CHANGE EVERY 14 DAYS      diphenhydrAMINE-acetaminophen (Tylenol PM Extra Strength)  MG tablet per tablet Tylenol PM Extra Strength  mg oral tablet take 2 tablets by oral route once a day (at bedtime) as needed   Active      ferrous sulfate 325 (65 FE) MG tablet Take 1 tablet by mouth Daily With Breakfast.      hydroCHLOROthiazide 25 MG tablet Take 1 tablet by mouth Daily.      hydrocortisone (ANUSOL-HC) 25 MG suppository Insert 1 suppository into the rectum 2 (Two) Times a Day. 24 suppository 0    Lantus SoloStar 100 UNIT/ML injection pen INJECT 10 UNITS UNDER THE SKIN DAILY      levothyroxine (SYNTHROID, LEVOTHROID) 88 MCG tablet Take 1 tablet by mouth Daily.      metFORMIN (GLUCOPHAGE) 500 MG tablet Take 1 tablet by mouth Every 12 (Twelve) Hours.      Orencia ClickJect 125 MG/ML solution auto-injector 1 (One) Time Per Week.      predniSONE (DELTASONE) 5 MG tablet Take 1 tablet by mouth Daily.      traMADol (ULTRAM) 50 MG tablet TAKE 1 TABLET BY MOUTH THREE TIMES DAILY AS NEEDED FOR SEVERE PAIN      vitamin D (ERGOCALCIFEROL) 1.25 MG (64619 UT) capsule capsule Take 1 capsule by mouth 1 (One) Time Per Week.      diclofenac (VOLTAREN) 75 MG EC tablet Take 75 mg by mouth 2 (Two) Times a Day. (Patient not taking: Reported on 5/21/2024)       No current facility-administered medications for this visit.     Review of Systems   Constitutional: Negative.    Skin:         Painful toenails.   All other systems reviewed and are negative.      OBJECTIVE     Vitals:    08/13/24 1407   BP: 117/73   Pulse: 82   Temp: 97.3 °F (36.3 °C)   SpO2: 95%         Body mass index is 22.96 kg/m².    No results found for: \"HGBA1C\"    Lab Results   Component Value Date    GLUCOSE 145 (H) 04/21/2022    CALCIUM " 9.1 04/21/2022     04/21/2022    K 4.9 04/21/2022    CO2 24.2 04/21/2022    CL 99 04/21/2022    BUN 26 (H) 04/21/2022    CREATININE 1.09 (H) 04/21/2022    BCR 23.9 04/21/2022    ANIONGAP 12.8 04/21/2022       Patient seen in no apparent distress.      PHYSICAL EXAM:     Foot/Ankle Exam    GENERAL  Appearance:  elderly and chronically ill  Orientation:  AAOx3  Affect:  appropriate  Gait:  antalgic  Assistance:  independent and walker  Right shoe gear: casual shoe  Left shoe gear: casual shoe    VASCULAR     Right Foot Vascularity   Dorsalis pedis:  1+  Posterior tibial:  1+  Skin temperature:  warm  Edema grading:  None  CFT:  < 3 seconds  Pedal hair growth:  Absent  Varicosities:  moderate varicosities     Left Foot Vascularity   Dorsalis pedis:  1+  Posterior tibial:  1+  Skin temperature:  warm  Edema grading:  None  CFT:  < 3 seconds  Pedal hair growth:  Absent  Varicosities:  moderate varicosities     NEUROLOGIC     Right Foot Neurologic   Normal sensation    Light touch sensation: normal  Vibratory sensation: normal  Hot/Cold sensation: normal  Protective Sensation using [place]-Itz Monofilament:   Sites intact: 10  Sites tested: 10     Left Foot Neurologic   Normal sensation    Light touch sensation: normal  Vibratory sensation: normal  Hot/Cold sensation:  normal  Protective Sensation using Castaner-Itz Monofilament:   Sites intact: 10  Sites tested: 10    MUSCULOSKELETAL     Right Foot Musculoskeletal   Arch:  Pes planus  Hallux valgus: Yes       Left Foot Musculoskeletal   Arch:  Pes planus  Hallux valgus: Yes      MUSCLE STRENGTH     Right Foot Muscle Strength   Foot dorsiflexion:  4-  Foot plantar flexion:  4-  Foot inversion:  4-  Foot eversion:  4-     Left Foot Muscle Strength   Foot dorsiflexion:  4-  Foot plantar flexion:  4-  Foot inversion:  4-  Foot eversion:  4-    RANGE OF MOTION     Right Foot Range of Motion   Foot and ankle ROM within normal limits       Left Foot Range of  Motion   Foot and ankle ROM within normal limits      DERMATOLOGIC      Right Foot Dermatologic   Skin  Right foot skin is intact.   Nails  1.  Positive for elongated, onychomycosis, abnormal thickness, subungual debris and ingrown toenail.  2.  Positive for elongated, onychomycosis, abnormal thickness, subungual debris and ingrown toenail.  3.  Positive for elongated, onychomycosis, abnormal thickness, subungual debris and ingrown toenail.  4.  Positive for elongated, onychomycosis, abnormal thickness, subungual debris and ingrown toenail.  5.  Positive for elongated, onychomycosis, abnormal thickness, subungual debris and ingrown toenail.  Nails comment:  Toenails 1, 2, 3, 4, and 5     Left Foot Dermatologic   Skin  Left foot skin is intact.   Nails comment:  Toenails 1, 2, 3, 4, and 5  Nails  1.  Positive for elongated, onychomycosis, abnormal thickness, subungual debris and ingrown toenail.  2.  Positive for elongated, onychomycosis, abnormal thickness, subungual debris and ingrown toenail.  3.  Positive for elongated, onychomycosis, abnormal thickness, subungual debris and ingrown toenail.  4.  Positive for elongated, onychomycosis, abnormally thick, subungual debris and ingrown toenail.  5.  Positive for elongated, onychomycosis, abnormally thick, subungual debris and ingrown toenail.    I have reexamined the patient the results are consistent with the previously documented exam.    ASSESSMENT/PLAN     Diagnoses and all orders for this visit:    1. Onychomycosis (Primary)    2. Onychocryptosis    3. Difficulty walking    4. Type 2 diabetes mellitus with diabetic polyneuropathy, with long-term current use of insulin    5. DM feet        Comprehensive lower extremity examination and evaluation was performed.    Discussed findings and treatment plan including risks, benefits, and treatment options with patient in detail. Patient agreed with treatment plan.    Medications and allergies reviewed.  Reviewed  available blood glucose and HgB A1C lab values along with other pertinent labs.  These were discussed with the patient as to their importance of diabetic maintenance.    Toenails 1, 2, 3, 4, 5 on Right and 1, 2, 3, 4, 5 on Left were debrided with nail nippers then filed with a Dremel nail taisha.  Patient tolerated procedure well without complications.    An After Visit Summary was printed and given to the patient at discharge, including (if requested) any available informative/educational handouts regarding diagnosis, treatment, or medications. All questions were answered to patient/family satisfaction. Should symptoms fail to improve or worsen they agree to call or return to clinic or to go to the Emergency Department. Discussed the importance of following up with any needed screening tests/labs/specialist appointments and any requested follow-up recommended by me today. Importance of maintaining follow-up discussed and patient accepts that missed appointments can delay diagnosis and potentially lead to worsening of conditions.    Return in about 9 weeks (around 10/15/2024) for Toenail Care., or sooner if acute issues arise.    I have reviewed the assessment and plan and verified the accuracy of it. No changes to assessment and plan since the information was documented. Lucas Copeland DPM 08/13/24     I have dictated this note utilizing Dragon Dictation.  Please note that portions of this note were completed with a voice recognition program.  Part of this note may be an electronic transcription/translation of spoken language to printed text using the Dragon Dictation System.          This document has been electronically signed by Lucas Copeland DPM on August 13, 2024 14:16 EDT

## 2024-11-18 ENCOUNTER — TELEPHONE (OUTPATIENT)
Dept: PODIATRY | Facility: CLINIC | Age: 84
End: 2024-11-18

## 2024-11-18 NOTE — TELEPHONE ENCOUNTER
Caller: [facility]    Relationship to patient: Emergency Contact    Best call back number: [phone]    Chief complaint: ROUTINE NAIL CARE    Type of visit: FOLLOW UP    Requested date: 11.27.2024     Additional notes:SPOKE WITH DAUGHTER- TRYING TO SCHEDULE AN APPOINTMENT FOR PT.  DAUGHTER IS ASKING FOR AN AFTERNOON APPOINTMENT- 1ST AVAIL FOR DR. RITCHIE IS JAN 16 . 2025    PLEASE CONTACT MS. TERRAZAS AND ADVISE

## 2025-02-05 ENCOUNTER — OFFICE VISIT (OUTPATIENT)
Dept: PODIATRY | Facility: CLINIC | Age: 85
End: 2025-02-05
Payer: MEDICARE

## 2025-02-05 VITALS
OXYGEN SATURATION: 96 % | DIASTOLIC BLOOD PRESSURE: 83 MMHG | HEART RATE: 84 BPM | WEIGHT: 138 LBS | BODY MASS INDEX: 22.99 KG/M2 | SYSTOLIC BLOOD PRESSURE: 156 MMHG | HEIGHT: 65 IN

## 2025-02-05 DIAGNOSIS — L60.0 ONYCHOCRYPTOSIS: ICD-10-CM

## 2025-02-05 DIAGNOSIS — Z79.4 TYPE 2 DIABETES MELLITUS WITH DIABETIC POLYNEUROPATHY, WITH LONG-TERM CURRENT USE OF INSULIN: ICD-10-CM

## 2025-02-05 DIAGNOSIS — E11.8 DM FEET: ICD-10-CM

## 2025-02-05 DIAGNOSIS — E11.42 TYPE 2 DIABETES MELLITUS WITH DIABETIC POLYNEUROPATHY, WITH LONG-TERM CURRENT USE OF INSULIN: ICD-10-CM

## 2025-02-05 DIAGNOSIS — R26.2 DIFFICULTY WALKING: ICD-10-CM

## 2025-02-05 DIAGNOSIS — B35.1 ONYCHOMYCOSIS: Primary | ICD-10-CM

## 2025-02-05 NOTE — PROGRESS NOTES
[facility] - PODIATRY    Today's Date: 02/05/25    Patient Name: Carol Antunez  MRN: [id number]  CSN: [id number]  PCP: Luis Enrique Archer MD, Last PCP Visit:  1/3/2025  Referring Provider: No ref. provider found    SUBJECTIVE     Chief Complaint   Patient presents with    Left Foot - Follow-up, Nail Problem    Right Foot - Follow-up, Nail Problem     HPI: Carol Antunez, a 84 y.o.female, presents to clinic for painful toenail.  Pt presents with daughter.    New, Established, New Problem: Established    Location:  Toenails    Duration:   Greater than one year    Onset:  Gradual    Nature:  sore with palpation.    Stable, worsening, improving:   Improving    Aggravating factors:  Pain with shoe gear and ambulation.    Previous Treatment:  Debridement    Patient controlling diabetes via:  insulin    Patient states their last blood glucose was:  101.    Medical changes:  no changes    Patient denies any fevers, chills, nausea, vomiting, shortness of breath, nor any other constitutional signs nor symptoms.    I have reviewed/confirmed previously documented HPI with no changes.     Past Medical History:   Diagnosis Date    Anemia     Arthritis     Diabetes mellitus     Disease of thyroid gland     Hemorrhoid     Hypertension      Past Surgical History:   Procedure Laterality Date    APPENDECTOMY      HYSTERECTOMY      THYROID SURGERY       Family History   Family history unknown: Yes     Social History     Socioeconomic History    Marital status:    Tobacco Use    Smoking status: Never    Smokeless tobacco: Never   Vaping Use    Vaping status: Never Used   Substance and Sexual Activity    Alcohol use: Not Currently    Drug use: Never    Sexual activity: Defer     No Known Allergies  Current Outpatient Medications   Medication Sig Dispense Refill    Accu-Chek Guide test strip CHECK BLOOD SUGAR FOUR TIMES DAILY AND AS NEEDED      Accu-Chek Softclix Lancets lancets 1 each by Other route 2 (Two) Times a  "Day.      acetaminophen (Tylenol) 325 MG tablet 1 tablet As Needed.      BD Pen Needle Deana 2nd Gen 32G X 4 MM misc Daily. as directed      Continuous Blood Gluc Sensor (FreeStyle Charley 2 Sensor) misc USE TO MONITOR BLOOD GLUCOSE. CHANGE EVERY 14 DAYS      diclofenac (VOLTAREN) 75 MG EC tablet Take 1 tablet by mouth 2 (Two) Times a Day.      diphenhydrAMINE-acetaminophen (Tylenol PM Extra Strength)  MG tablet per tablet Tylenol PM Extra Strength  mg oral tablet take 2 tablets by oral route once a day (at bedtime) as needed   Active      ferrous sulfate 325 (65 FE) MG tablet Take 1 tablet by mouth Daily With Breakfast.      hydroCHLOROthiazide 25 MG tablet Take 1 tablet by mouth Daily.      hydrocortisone (ANUSOL-HC) 25 MG suppository Insert 1 suppository into the rectum 2 (Two) Times a Day. 24 suppository 0    Lantus SoloStar 100 UNIT/ML injection pen INJECT 10 UNITS UNDER THE SKIN DAILY      levothyroxine (SYNTHROID, LEVOTHROID) 88 MCG tablet Take 1 tablet by mouth Daily.      metFORMIN (GLUCOPHAGE) 500 MG tablet Take 1 tablet by mouth Every 12 (Twelve) Hours.      Orencia ClickJect 125 MG/ML solution auto-injector 1 (One) Time Per Week.      predniSONE (DELTASONE) 5 MG tablet Take 1 tablet by mouth Daily.      traMADol (ULTRAM) 50 MG tablet TAKE 1 TABLET BY MOUTH THREE TIMES DAILY AS NEEDED FOR SEVERE PAIN      vitamin D (ERGOCALCIFEROL) 1.25 MG (95424 UT) capsule capsule Take 1 capsule by mouth 1 (One) Time Per Week.       No current facility-administered medications for this visit.     Review of Systems   Constitutional: Negative.    Skin:         Painful toenails.   All other systems reviewed and are negative.      OBJECTIVE     Vitals:    02/05/25 1058   BP: 156/83   Pulse: 84   SpO2: 96%         Body mass index is 22.96 kg/m².    No results found for: \"HGBA1C\"    Lab Results   Component Value Date    GLUCOSE 145 (H) 04/21/2022    CALCIUM 9.1 04/21/2022     04/21/2022    K 4.9 04/21/2022    " CO2 24.2 04/21/2022    CL 99 04/21/2022    BUN 26 (H) 04/21/2022    CREATININE 1.09 (H) 04/21/2022    BCR 23.9 04/21/2022    ANIONGAP 12.8 04/21/2022       Patient seen in no apparent distress.      PHYSICAL EXAM:     Foot/Ankle Exam    GENERAL  Appearance:  elderly and chronically ill  Orientation:  AAOx3  Affect:  appropriate  Gait:  antalgic  Assistance:  wheelchair  Right shoe gear: casual shoe  Left shoe gear: casual shoe    VASCULAR     Right Foot Vascularity   Dorsalis pedis:  1+  Posterior tibial:  1+  Skin temperature:  warm  Edema grading:  None  CFT:  < 3 seconds  Pedal hair growth:  Absent  Varicosities:  moderate varicosities     Left Foot Vascularity   Dorsalis pedis:  1+  Posterior tibial:  1+  Skin temperature:  warm  Edema grading:  None  CFT:  < 3 seconds  Pedal hair growth:  Absent  Varicosities:  moderate varicosities     NEUROLOGIC     Right Foot Neurologic   Normal sensation    Light touch sensation: normal  Vibratory sensation: normal  Hot/Cold sensation: normal  Protective Sensation using [place] Monofilament:   Sites intact: 10  Sites tested: 10     Left Foot Neurologic   Normal sensation    Light touch sensation: normal  Vibratory sensation: normal  Hot/Cold sensation:  normal  Protective Sensation using [place] Monofilament:   Sites intact: 10  Sites tested: 10    MUSCULOSKELETAL     Right Foot Musculoskeletal   Arch:  Pes planus  Hallux valgus: Yes       Left Foot Musculoskeletal   Arch:  Pes planus  Hallux valgus: Yes      MUSCLE STRENGTH     Right Foot Muscle Strength   Foot dorsiflexion:  4-  Foot plantar flexion:  4-  Foot inversion:  4-  Foot eversion:  4-     Left Foot Muscle Strength   Foot dorsiflexion:  4-  Foot plantar flexion:  4-  Foot inversion:  4-  Foot eversion:  4-    RANGE OF MOTION     Right Foot Range of Motion   Foot and ankle ROM within normal limits       Left Foot Range of Motion   Foot and ankle ROM within normal limits      DERMATOLOGIC       Right Foot Dermatologic   Skin  Right foot skin is intact.   Nails  1.  Positive for elongated, onychomycosis, abnormal thickness, subungual debris and ingrown toenail.  2.  Positive for elongated, onychomycosis, abnormal thickness, subungual debris and ingrown toenail.  3.  Positive for elongated, onychomycosis, abnormal thickness, subungual debris and ingrown toenail.  4.  Positive for elongated, onychomycosis, abnormal thickness, subungual debris and ingrown toenail.  5.  Positive for elongated, onychomycosis, abnormal thickness, subungual debris and ingrown toenail.  Nails comment:  Toenails 1, 2, 3, 4, and 5     Left Foot Dermatologic   Skin  Left foot skin is intact.   Nails comment:  Toenails 1, 2, 3, 4, and 5  Nails  1.  Positive for elongated, onychomycosis, abnormal thickness, subungual debris and ingrown toenail.  2.  Positive for elongated, onychomycosis, abnormal thickness, subungual debris and ingrown toenail.  3.  Positive for elongated, onychomycosis, abnormal thickness, subungual debris and ingrown toenail.  4.  Positive for elongated, onychomycosis, abnormally thick, subungual debris and ingrown toenail.  5.  Positive for elongated, onychomycosis, abnormally thick, subungual debris and ingrown toenail.    I have reexamined the patient the results are consistent with the previously documented exam.    ASSESSMENT/PLAN     Diagnoses and all orders for this visit:    1. Onychomycosis (Primary)    2. Onychocryptosis    3. Difficulty walking    4. Type 2 diabetes mellitus with diabetic polyneuropathy, with long-term current use of insulin    5. DM feet    Comprehensive lower extremity examination and evaluation was performed.    Discussed findings and treatment plan including risks, benefits, and treatment options with patient in detail. Patient agreed with treatment plan.    Medications and allergies reviewed.  Reviewed available blood glucose and HgB A1C lab values along with other pertinent labs.   These were discussed with the patient as to their importance of diabetic maintenance.    Toenails 1, 2, 3, 4, 5 on Right and 1, 2, 3, 4, 5 on Left were debrided with nail nippers then filed with a Dremel nail taisha.  Patient tolerated procedure well without complications.    An After Visit Summary was printed and given to the patient at discharge, including (if requested) any available informative/educational handouts regarding diagnosis, treatment, or medications. All questions were answered to patient/family satisfaction. Should symptoms fail to improve or worsen they agree to call or return to clinic or to go to the Emergency Department. Discussed the importance of following up with any needed screening tests/labs/specialist appointments and any requested follow-up recommended by me today. Importance of maintaining follow-up discussed and patient accepts that missed appointments can delay diagnosis and potentially lead to worsening of conditions.    Return in about 9 weeks (around 4/9/2025) for Toenail Care., or sooner if acute issues arise.    I have reviewed the assessment and plan and verified the accuracy of it. No changes to assessment and plan since the information was documented. Lucas Copeland DPM 02/05/25     I have dictated this note utilizing Dragon Dictation.  Please note that portions of this note were completed with a voice recognition program.  Part of this note may be an electronic transcription/translation of spoken language to printed text using the Dragon Dictation System.      This document has been electronically signed by Lucas Copeland DPM on February 5, 2025 11:17 EST

## 2025-04-29 ENCOUNTER — OFFICE VISIT (OUTPATIENT)
Dept: PODIATRY | Facility: CLINIC | Age: 85
End: 2025-04-29
Payer: MEDICARE

## 2025-04-29 VITALS
SYSTOLIC BLOOD PRESSURE: 127 MMHG | HEART RATE: 66 BPM | OXYGEN SATURATION: 95 % | BODY MASS INDEX: 22.96 KG/M2 | WEIGHT: 138 LBS | TEMPERATURE: 98 F | DIASTOLIC BLOOD PRESSURE: 71 MMHG

## 2025-04-29 DIAGNOSIS — E11.8 DM FEET: ICD-10-CM

## 2025-04-29 DIAGNOSIS — Z79.4 TYPE 2 DIABETES MELLITUS WITH DIABETIC POLYNEUROPATHY, WITH LONG-TERM CURRENT USE OF INSULIN: ICD-10-CM

## 2025-04-29 DIAGNOSIS — E11.42 TYPE 2 DIABETES MELLITUS WITH DIABETIC POLYNEUROPATHY, WITH LONG-TERM CURRENT USE OF INSULIN: ICD-10-CM

## 2025-04-29 DIAGNOSIS — L60.0 ONYCHOCRYPTOSIS: ICD-10-CM

## 2025-04-29 DIAGNOSIS — B35.1 ONYCHOMYCOSIS: Primary | ICD-10-CM

## 2025-04-29 DIAGNOSIS — R26.2 DIFFICULTY WALKING: ICD-10-CM

## 2025-04-29 NOTE — PROGRESS NOTES
[facility] - PODIATRY    Today's Date: 04/29/25    Patient Name: Carol Antunez  MRN: [id number]  CSN: [id number]  PCP: Luis Enrique Archer MD, Last PCP Visit:  3/29/2025  Referring Provider: No ref. provider found    SUBJECTIVE     Chief Complaint   Patient presents with    Left Foot - Follow-up, Nail Problem    Right Foot - Follow-up, Nail Problem     HPI: Carol Antunez, a 84 y.o.female, presents to clinic for painful toenail.  Pt presents with daughter.    New, Established, New Problem: Established    Location:  Toenails    Duration:   Greater than one year    Onset:  Gradual    Nature:  sore with palpation.    Stable, worsening, improving:   Improving    Aggravating factors:  Pain with shoe gear and ambulation.    Previous Treatment:  Debridement    Patient controlling diabetes via:  insulin    Patient states their last blood glucose was:  150.    Medical changes:  none    Patient denies any fevers, chills, nausea, vomiting, shortness of breath, nor any other constitutional signs nor symptoms.    I have reviewed/confirmed previously documented HPI with no changes.     Past Medical History:   Diagnosis Date    Anemia     Arthritis     Diabetes mellitus     Disease of thyroid gland     Hemorrhoid     Hypertension      Past Surgical History:   Procedure Laterality Date    APPENDECTOMY      HYSTERECTOMY      THYROID SURGERY       Family History   Family history unknown: Yes     Social History     Socioeconomic History    Marital status:    Tobacco Use    Smoking status: Never    Smokeless tobacco: Never   Vaping Use    Vaping status: Never Used   Substance and Sexual Activity    Alcohol use: Not Currently    Drug use: Never    Sexual activity: Defer     No Known Allergies  Current Outpatient Medications   Medication Sig Dispense Refill    Accu-Chek Guide test strip CHECK BLOOD SUGAR FOUR TIMES DAILY AND AS NEEDED      Accu-Chek Softclix Lancets lancets 1 each by Other route 2 (Two) Times a Day.  "     acetaminophen (Tylenol) 325 MG tablet 1 tablet As Needed.      BD Pen Needle Deana 2nd Gen 32G X 4 MM misc Daily. as directed      Continuous Blood Gluc Sensor (FreeStyle Charley 2 Sensor) [facility] USE TO MONITOR BLOOD GLUCOSE. CHANGE EVERY 14 DAYS      diclofenac (VOLTAREN) 75 MG EC tablet Take 1 tablet by mouth 2 (Two) Times a Day.      diphenhydrAMINE-acetaminophen (Tylenol PM Extra Strength)  MG tablet per tablet Tylenol PM Extra Strength  mg oral tablet take 2 tablets by oral route once a day (at bedtime) as needed   Active      ferrous sulfate 325 (65 FE) MG tablet Take 1 tablet by mouth Daily With Breakfast.      hydroCHLOROthiazide 25 MG tablet Take 1 tablet by mouth Daily.      hydrocortisone (ANUSOL-HC) 25 MG suppository Insert 1 suppository into the rectum 2 (Two) Times a Day. 24 suppository 0    Lantus SoloStar 100 UNIT/ML injection pen INJECT 10 UNITS UNDER THE SKIN DAILY      levothyroxine (SYNTHROID, LEVOTHROID) 88 MCG tablet Take 1 tablet by mouth Daily.      metFORMIN (GLUCOPHAGE) 500 MG tablet Take 1 tablet by mouth Every 12 (Twelve) Hours.      Orencia ClickJect 125 MG/ML solution auto-injector 1 (One) Time Per Week.      predniSONE (DELTASONE) 5 MG tablet Take 1 tablet by mouth Daily.      traMADol (ULTRAM) 50 MG tablet TAKE 1 TABLET BY MOUTH THREE TIMES DAILY AS NEEDED FOR SEVERE PAIN      vitamin D (ERGOCALCIFEROL) 1.25 MG (85798 UT) capsule capsule Take 1 capsule by mouth 1 (One) Time Per Week.       No current facility-administered medications for this visit.     Review of Systems   Constitutional: Negative.    Skin:         Painful toenails.   All other systems reviewed and are negative.      OBJECTIVE     Vitals:    04/29/25 1046   BP: 127/71   Pulse: 66   Temp: 98 °F (36.7 °C)   SpO2: 95%         Body mass index is 22.96 kg/m².    No results found for: \"HGBA1C\"    Lab Results   Component Value Date    GLUCOSE 145 (H) 04/21/2022    CALCIUM 9.1 04/21/2022     04/21/2022    K " 4.9 04/21/2022    CO2 24.2 04/21/2022    CL 99 04/21/2022    BUN 26 (H) 04/21/2022    CREATININE 1.09 (H) 04/21/2022    BCR 23.9 04/21/2022    ANIONGAP 12.8 04/21/2022       Patient seen in no apparent distress.      PHYSICAL EXAM:     Foot/Ankle Exam    GENERAL  Appearance:  elderly and chronically ill  Orientation:  AAOx3  Affect:  appropriate  Gait:  antalgic  Assistance:  wheelchair  Right shoe gear: casual shoe  Left shoe gear: casual shoe    VASCULAR     Right Foot Vascularity   Dorsalis pedis:  1+  Posterior tibial:  1+  Skin temperature:  warm  Edema grading:  None  CFT:  < 3 seconds  Pedal hair growth:  Absent  Varicosities:  moderate varicosities     Left Foot Vascularity   Dorsalis pedis:  1+  Posterior tibial:  1+  Skin temperature:  warm  Edema grading:  None  CFT:  < 3 seconds  Pedal hair growth:  Absent  Varicosities:  moderate varicosities     NEUROLOGIC     Right Foot Neurologic   Normal sensation    Light touch sensation: normal  Vibratory sensation: normal  Hot/Cold sensation: normal  Protective Sensation using [place]-Itz Monofilament:   Sites intact: 10  Sites tested: 10     Left Foot Neurologic   Normal sensation    Light touch sensation: normal  Vibratory sensation: normal  Hot/Cold sensation:  normal  Protective Sensation using [place]-Itz Monofilament:   Sites intact: 10  Sites tested: 10    MUSCULOSKELETAL     Right Foot Musculoskeletal   Arch:  Pes planus  Hallux valgus: Yes       Left Foot Musculoskeletal   Arch:  Pes planus  Hallux valgus: Yes      MUSCLE STRENGTH     Right Foot Muscle Strength   Foot dorsiflexion:  4-  Foot plantar flexion:  4-  Foot inversion:  4-  Foot eversion:  4-     Left Foot Muscle Strength   Foot dorsiflexion:  4-  Foot plantar flexion:  4-  Foot inversion:  4-  Foot eversion:  4-    RANGE OF MOTION     Right Foot Range of Motion   Foot and ankle ROM within normal limits       Left Foot Range of Motion   Foot and ankle ROM within normal limits       DERMATOLOGIC      Right Foot Dermatologic   Skin  Right foot skin is intact.   Nails  1.  Positive for elongated, onychomycosis, abnormal thickness, subungual debris and ingrown toenail.  2.  Positive for elongated, onychomycosis, abnormal thickness, subungual debris and ingrown toenail.  3.  Positive for elongated, onychomycosis, abnormal thickness, subungual debris and ingrown toenail.  4.  Positive for elongated, onychomycosis, abnormal thickness, subungual debris and ingrown toenail.  5.  Positive for elongated, onychomycosis, abnormal thickness, subungual debris and ingrown toenail.  Nails comment:  Toenails 1, 2, 3, 4, and 5     Left Foot Dermatologic   Skin  Left foot skin is intact.   Nails comment:  Toenails 1, 2, 3, 4, and 5  Nails  1.  Positive for elongated, onychomycosis, abnormal thickness, subungual debris and ingrown toenail.  2.  Positive for elongated, onychomycosis, abnormal thickness, subungual debris and ingrown toenail.  3.  Positive for elongated, onychomycosis, abnormal thickness, subungual debris and ingrown toenail.  4.  Positive for elongated, onychomycosis, abnormally thick, subungual debris and ingrown toenail.  5.  Positive for elongated, onychomycosis, abnormally thick, subungual debris and ingrown toenail.    I have reexamined the patient the results are consistent with the previously documented exam.    ASSESSMENT/PLAN     Diagnoses and all orders for this visit:    1. Onychomycosis (Primary)    2. Onychocryptosis    3. Difficulty walking    4. Type 2 diabetes mellitus with diabetic polyneuropathy, with long-term current use of insulin    5. DM feet    Comprehensive lower extremity examination and evaluation was performed.    Discussed findings and treatment plan including risks, benefits, and treatment options with patient in detail. Patient agreed with treatment plan.    Medications and allergies reviewed.  Reviewed available blood glucose and HgB A1C lab values along with  other pertinent labs.  These were discussed with the patient as to their importance of diabetic maintenance.    Toenails 1, 2, 3, 4, 5 on Right and 1, 2, 3, 4, 5 on Left were debrided with nail nippers then filed with a Dremel nail taisha.  Patient tolerated procedure well without complications.    An After Visit Summary was printed and given to the patient at discharge, including (if requested) any available informative/educational handouts regarding diagnosis, treatment, or medications. All questions were answered to patient/family satisfaction. Should symptoms fail to improve or worsen they agree to call or return to clinic or to go to the Emergency Department. Discussed the importance of following up with any needed screening tests/labs/specialist appointments and any requested follow-up recommended by me today. Importance of maintaining follow-up discussed and patient accepts that missed appointments can delay diagnosis and potentially lead to worsening of conditions.    Return in about 9 weeks (around 7/1/2025) for Toenail Care., or sooner if acute issues arise.    I have reviewed the assessment and plan and verified the accuracy of it. No changes to assessment and plan since the information was documented. Lucas Copeland DPM 04/29/25     I have dictated this note utilizing Dragon Dictation.  Please note that portions of this note were completed with a voice recognition program.  Part of this note may be an electronic transcription/translation of spoken language to printed text using the Dragon Dictation System.      This document has been electronically signed by Lucas Copeland DPM on April 29, 2025 11:05 EDT